# Patient Record
Sex: FEMALE | Race: WHITE | Employment: UNEMPLOYED | URBAN - METROPOLITAN AREA
[De-identification: names, ages, dates, MRNs, and addresses within clinical notes are randomized per-mention and may not be internally consistent; named-entity substitution may affect disease eponyms.]

---

## 2024-08-21 PROCEDURE — 99284 EMERGENCY DEPT VISIT MOD MDM: CPT

## 2024-08-21 PROCEDURE — 99283 EMERGENCY DEPT VISIT LOW MDM: CPT

## 2024-08-22 ENCOUNTER — HOSPITAL ENCOUNTER (EMERGENCY)
Facility: HOSPITAL | Age: 29
Discharge: HOME | End: 2024-08-22
Attending: EMERGENCY MEDICINE

## 2024-08-22 ENCOUNTER — APPOINTMENT (OUTPATIENT)
Dept: GENERAL RADIOLOGY | Facility: HOSPITAL | Age: 29
End: 2024-08-22
Attending: EMERGENCY MEDICINE

## 2024-08-22 VITALS
WEIGHT: 132.25 LBS | SYSTOLIC BLOOD PRESSURE: 122 MMHG | RESPIRATION RATE: 18 BRPM | DIASTOLIC BLOOD PRESSURE: 81 MMHG | HEART RATE: 79 BPM | OXYGEN SATURATION: 100 % | BODY MASS INDEX: 23.43 KG/M2 | TEMPERATURE: 97 F | HEIGHT: 62.99 IN

## 2024-08-22 DIAGNOSIS — S02.2XXA CLOSED FRACTURE OF NASAL BONE, INITIAL ENCOUNTER: Primary | ICD-10-CM

## 2024-08-22 PROCEDURE — 70160 X-RAY EXAM OF NASAL BONES: CPT | Performed by: EMERGENCY MEDICINE

## 2024-08-22 RX ORDER — IBUPROFEN 600 MG/1
600 TABLET, FILM COATED ORAL ONCE
Status: COMPLETED | OUTPATIENT
Start: 2024-08-22 | End: 2024-08-22

## 2024-08-22 RX ORDER — TRAMADOL HYDROCHLORIDE 50 MG/1
TABLET ORAL EVERY 6 HOURS PRN
Qty: 14 TABLET | Refills: 0 | Status: SHIPPED | OUTPATIENT
Start: 2024-08-22 | End: 2024-08-27

## 2024-08-22 NOTE — ED INITIAL ASSESSMENT (HPI)
Patient arrives from home with reports of a nose bleed after the dog jumped up and hit her nose. Bleeding controlled on arrival. Previous cosmetic surgery to nose.

## 2024-08-22 NOTE — ED PROVIDER NOTES
Patient Seen in: [facility] Emergency Department    History     Chief Complaint   Patient presents with    Nose Bleed       HPI    28-year-old female presents ER with complaint of nose trauma.  Patient states that she was hit in the nose by her dogs collar the ribs around his neck.  Patient is concerned because she has history of cosmetic nose surgery and wants to make sure it is not broken.  Patient with positive tenderness to the bridge of the proximal part of the nare, no obvious bruising    History reviewed. History reviewed. No pertinent past medical history.    History reviewed. History reviewed. No pertinent surgical history.      Medications :  (Not in a hospital admission)       No family history on file.    Smoking Status:   Social History     Socioeconomic History    Marital status:    Tobacco Use    Smoking status: Every Day     Types: Cigarettes    Smokeless tobacco: Never   Substance and Sexual Activity    Alcohol use: Not Currently    Drug use: Not Currently       ROS  All pertinent positives for the review of systems are mentioned in the HPI  All other organ systems are reviewed and are negative.    Constitutional and vital signs reviewed.      Social History and Family History elements reviewed from today, pertinent positives to the presenting problem noted.    Physical Exam     ED Triage Vitals [08/22/24 0002]   /81   Pulse 79   Resp 18   Temp 97.1 °F (36.2 °C)   Temp src Temporal   SpO2 100 %   O2 Device None (Room air)       All measures to prevent infection transmission during my interaction with the patient were taken. The patient was already wearing a droplet mask on my arrival to the room. Personal protective equipment including droplet mask, eye protection, and gloves were worn throughout the duration of the exam.  Handwashing was performed prior to and after the exam.  Stethoscope and any equipment used during my examination was cleaned with super sani-cloth germicidal  wipes following the exam.     Physical Exam  Vitals and nursing note reviewed.   Constitutional:       Appearance: She is well-developed.   HENT:      Head: Normocephalic and atraumatic.      Right Ear: External ear normal.      Left Ear: External ear normal.      Nose: Nose normal.        Comments: Positive tenderness and swelling to the circled area of the nasal bridge  Eyes:      Conjunctiva/sclera: Conjunctivae normal.      Pupils: Pupils are equal, round, and reactive to light.   Cardiovascular:      Rate and Rhythm: Normal rate and regular rhythm.      Heart sounds: Normal heart sounds.   Pulmonary:      Effort: Pulmonary effort is normal.      Breath sounds: Normal breath sounds.   Abdominal:      General: Bowel sounds are normal.      Palpations: Abdomen is soft.   Musculoskeletal:         General: Normal range of motion.      Cervical back: Normal range of motion and neck supple.   Skin:     General: Skin is warm and dry.   Neurological:      Mental Status: She is alert and oriented to person, place, and time.      Deep Tendon Reflexes: Reflexes are normal and symmetric.   Psychiatric:         Behavior: Behavior normal.         Thought Content: Thought content normal.         Judgment: Judgment normal.         ED Course      Labs Reviewed - No data to display      Imaging Results Available and Reviewed while in ED: No results found.  ED Medications Administered:   Medications   ibuprofen (Motrin) tab 600 mg (600 mg Oral Given 8/22/24 0038)         MDM     Vitals:    08/22/24 0002   BP: 122/81   Pulse: 79   Resp: 18   Temp: 97.1 °F (36.2 °C)   TempSrc: Temporal   SpO2: 100%   Weight: 60 kg   Height: 160 cm (5' 2.99\")     *I personally reviewed and interpreted all ED vitals.  I also personally reviewed all labs and imaging if ordered    Pulse Ox: 100%, Room air, Normal     Monitor Interpretation:   normal sinus rhythm    Differential Diagnosis/ Diagnostic Considerations: Nasal bone fracture, nasal  contusion,    Medical Record Review: I personally reviewed available prior medical records for any recent pertinent discharge summaries, testing, and procedures and reviewed those reports.    Complicating Factors: The patient already has does not have a problem list on file. to contribute to the complexity of this ED evaluation.    Medical Decision Making  28-year-old female presents ER with complaint of nasal pain after trauma from being hit by her dog.  Patient x-ray shows a positive nasal bone fracture.  Patient with some mild swelling.  Patient instructed to take Tylenol ibuprofen for pain as well as ice to the swelling and follow-up with ENT for definitive treatment of her bone fracture.  Patient's  bedside made aware of the discharge planning disposition.  Patient given tramadol for pain in case she needs more pain relief    Problems Addressed:  Closed fracture of nasal bone, initial encounter: acute illness or injury    Amount and/or Complexity of Data Reviewed  Independent Historian:      Details: Medical history obtained from the  who is bedside because patient is non-English speaking.  Radiology: ordered and independent interpretation performed. Decision-making details documented in ED Course.     Details: Nasal bone x-ray interpreted by myself shows acute fracture of the nasal bone    Risk  Prescription drug management.        Condition upon leaving the department: Stable    Disposition and Plan     Clinical Impression:  1. Closed fracture of nasal bone, initial encounter        Disposition:  Discharge    Follow-up:  Elsa Swanson MD  [address] [place] [id number]  [phone]    Schedule an appointment as soon as possible for a visit  for treatment of your nasla bone fracture      Medications Prescribed:  Current Discharge Medication List        START taking these medications    Details   traMADol 50 MG Oral Tab Take 1-2 tablets ( mg total) by mouth every 6 (six)  hours as needed.  Qty: 14 tablet, Refills: 0    Associated Diagnoses: Closed fracture of nasal bone, initial encounter